# Patient Record
Sex: FEMALE | Race: WHITE
[De-identification: names, ages, dates, MRNs, and addresses within clinical notes are randomized per-mention and may not be internally consistent; named-entity substitution may affect disease eponyms.]

---

## 2017-05-12 ENCOUNTER — HOSPITAL ENCOUNTER (OUTPATIENT)
Dept: HOSPITAL 47 - RADUSWWP | Age: 42
Discharge: HOME | End: 2017-05-12
Payer: COMMERCIAL

## 2017-05-12 DIAGNOSIS — R60.0: Primary | ICD-10-CM

## 2017-05-12 PROCEDURE — 93970 EXTREMITY STUDY: CPT
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
## 2017-06-02 ENCOUNTER — HOSPITAL ENCOUNTER (OUTPATIENT)
Dept: HOSPITAL 47 - RADECHMAIN | Age: 42
Discharge: HOME | End: 2017-06-02
Payer: COMMERCIAL

## 2017-06-02 DIAGNOSIS — Z90.49: ICD-10-CM

## 2017-06-02 DIAGNOSIS — N20.0: Primary | ICD-10-CM

## 2017-06-02 PROCEDURE — 76856 US EXAM PELVIC COMPLETE: CPT

## 2017-06-02 PROCEDURE — 76700 US EXAM ABDOM COMPLETE: CPT

## 2017-06-02 PROCEDURE — 93306 TTE W/DOPPLER COMPLETE: CPT

## 2017-06-02 PROCEDURE — 76830 TRANSVAGINAL US NON-OB: CPT

## 2017-06-02 NOTE — US
EXAMINATION TYPE: US pelvis complete transvag

 

DATE OF EXAM: 6/2/2017

 

COMPARISON: NONE

 

CLINICAL HISTORY: R60.0 LOCALIZED EDEMA. General pelvic pain/pressure; gets Depo injections--hasn't h
ad a cycle in a long time.

 

TECHNIQUE:  Transvaginal (TV) and Transabdominal (TA) 

 

EXAM MEASUREMENTS:

 

Uterus:  6.9 x 3.6 x 4.7 cm

Endometrial Stripe: 0.6 cm

Right Ovary:  2.0 x 1.0 x 2.0 cm

Left Ovary:  2.5 x 1.2 x 2.0 cm

 

TV added to better visualize the pelvic organs.

 

1. Uterus:  retroflexed some limitations; wnl as seen

2. Endometrium:  wnl

3. Right Ovary:  wnl

4. Left Ovary:  wnl

5. Bilateral Adnexa:  wnl

6. Posterior cul-de-sac:  no free fluid seen

 

IMPRESSION: 

 

NORMAL PELVIC ULTRASOUND.

## 2017-06-02 NOTE — ECHOF
Referral Reason:R60.0 localized edema



MEASUREMENTS

--------

HEIGHT: 160.0 cm

WEIGHT: 95.3 kg

BP: 

RVIDd:   2.6 cm     (< 3.3)

IVSd:   1.1 cm     (0.6 - 1.1)

LVIDd:   4.6 cm     (3.9 - 5.3)

LVPWd:   0.9 cm     (0.6 - 1.1)

IVSs:   1.5 cm

LVIDs:   3.3 cm

LVPWs:   1.2 cm

LA Diam:   3.3 cm     (2.7 - 3.8)

LAESV Index (A-L):   16.77 ml/m

Ao Diam:   2.9 cm     (2.0 - 3.7)

AV Cusp:   1.9 cm     (1.5 - 2.6)

LA Diam:   3.4 cm     (2.7 - 3.8)

MV EXCURSION:   15.271 mm     (> 18.000)

MV EF SLOPE:   112 mm/s     (70 - 150)

EPSS:   0.7 cm

MV E Forrest:   0.66 m/s

MV DecT:   238 ms

MV A Forrest:   0.67 m/s

MV E/A Ratio:   0.98 

RAP:   5.00 mmHg

RVSP:   19.61 mmHg







FINDINGS

--------

Sinus rhythm.

This was a technically adequate study.

LV size, wall thickness and systolic function are 

normal, with an EF greater than 55%.

The right ventricle is normal in size.

The right atrial size is normal.

The aortic valve is trileaflet, and appears 

structurally normal. No aortic stenosis or 

regurgitation.   The aortic valve is trileaflet and 

appears structurally normal.

Mild mitral regurgitation is present.

Mild tricuspid regurgitation present.   There is no 

evidence of pulmonary hypertension.   The right 

ventricular systolic pressure, as measured by Doppler, 

is 19.61mmHg.

There is no pulmonic regurgitation present.

The aortic root size is normal.

There is no pericardial effusion.



CONCLUSIONS

--------

1. LV size, wall thickness and systolic function are 

normal, with an EF greater than 55%.

2. The aortic valve is trileaflet and appears structurally 

normal.

3. Mild mitral regurgitation is present.

4. Mild tricuspid regurgitation present.

5. There is no evidence of pulmonary hypertension.

6. The right ventricular systolic pressure, as measured by 

Doppler, is 19.61mmHg.





SONOGRAPHER: Marlee Gary RDCS

## 2017-06-02 NOTE — US
EXAMINATION TYPE: US abdomen complete

 

DATE OF EXAM: 6/2/2017

 

COMPARISON: NONE

 

CLINICAL HISTORY: R60.0 LOCALIZED EDEMA. Larger habitus, post jina, gen back and abd pain

 

EXAM MEASUREMENTS:

 

Liver Length:  16.5cm cm   

Gallbladder Wall:  Surgically absent   

CBD:  0.6 cm

Spleen:  10.7 cm   

Right Kidney:  11.4 x 4.9 x 5.6 cm 

Left Kidney:  10.6 x 4.6 x 5.0  cm   

 

Some exam limitations due to pt habitus and increased overlying bowel gas.

 

Pancreas:  tail gassed out, vis portions wnl

Liver:  some limitations, appears wnl  

Gallbladder:  Surgically absent

**Evidence for sonographic Wise's sign:  no

CBD:  wnl 

Spleen:  wnl   

Right Kidney:  wnl   

Left Kidney:  seen with a 1.6 x 1.3 x 1.0 echogenic shadowing structure at the lower pole     

Upper IVC:  wnl as seen  

Abd Aorta:  wnl as seen

 

Limited views of the pancreas are normal.

 

The liver is normal in size without biliary dilatation.

 

The gallbladder is been removed. Distal common hepatic duct measures 6 mm.

 

The spleen is normal in size.

 

Left kidney is normal. There is a 1.3 cm calculus in the lower pole on the right. This is nonobstruct
ing.

 

Visualized portions of aorta and IVC are normal.

 

IMPRESSION: 

1. NONOBSTRUCTING, 1.3 CM LOWER POLE CALCULUS ON THE RIGHT.

2. STATUS POST CHOLECYSTECTOMY.

## 2018-02-01 ENCOUNTER — HOSPITAL ENCOUNTER (EMERGENCY)
Dept: HOSPITAL 47 - EC | Age: 43
Discharge: HOME | End: 2018-02-01
Payer: COMMERCIAL

## 2018-02-01 VITALS
TEMPERATURE: 98 F | DIASTOLIC BLOOD PRESSURE: 83 MMHG | HEART RATE: 74 BPM | RESPIRATION RATE: 16 BRPM | SYSTOLIC BLOOD PRESSURE: 127 MMHG

## 2018-02-01 DIAGNOSIS — F41.9: Primary | ICD-10-CM

## 2018-02-01 DIAGNOSIS — F31.9: ICD-10-CM

## 2018-02-01 DIAGNOSIS — Z79.899: ICD-10-CM

## 2018-02-01 DIAGNOSIS — E07.9: ICD-10-CM

## 2018-02-01 DIAGNOSIS — F17.200: ICD-10-CM

## 2018-02-01 DIAGNOSIS — R07.89: ICD-10-CM

## 2018-02-01 DIAGNOSIS — Z88.8: ICD-10-CM

## 2018-02-01 LAB
ALBUMIN SERPL-MCNC: 3.8 G/DL (ref 3.5–5)
ALP SERPL-CCNC: 119 U/L (ref 38–126)
ALT SERPL-CCNC: 68 U/L (ref 9–52)
ANION GAP SERPL CALC-SCNC: 9 MMOL/L
APAP SPEC-MCNC: <10 UG/ML
AST SERPL-CCNC: 33 U/L (ref 14–36)
BASOPHILS # BLD AUTO: 0.1 K/UL (ref 0–0.2)
BASOPHILS NFR BLD AUTO: 0 %
BUN SERPL-SCNC: 25 MG/DL (ref 7–17)
CALCIUM SPEC-MCNC: 9.5 MG/DL (ref 8.4–10.2)
CHLORIDE SERPL-SCNC: 108 MMOL/L (ref 98–107)
CO2 SERPL-SCNC: 27 MMOL/L (ref 22–30)
EOSINOPHIL # BLD AUTO: 0.2 K/UL (ref 0–0.7)
EOSINOPHIL NFR BLD AUTO: 1 %
ERYTHROCYTE [DISTWIDTH] IN BLOOD BY AUTOMATED COUNT: 4.62 M/UL (ref 3.8–5.4)
ERYTHROCYTE [DISTWIDTH] IN BLOOD: 14.7 % (ref 11.5–15.5)
GLUCOSE SERPL-MCNC: 85 MG/DL (ref 74–99)
HCT VFR BLD AUTO: 41 % (ref 34–46)
HGB BLD-MCNC: 13.9 GM/DL (ref 11.4–16)
LYMPHOCYTES # SPEC AUTO: 2.9 K/UL (ref 1–4.8)
LYMPHOCYTES NFR SPEC AUTO: 21 %
MCH RBC QN AUTO: 30.1 PG (ref 25–35)
MCHC RBC AUTO-ENTMCNC: 34 G/DL (ref 31–37)
MCV RBC AUTO: 88.6 FL (ref 80–100)
MONOCYTES # BLD AUTO: 0.5 K/UL (ref 0–1)
MONOCYTES NFR BLD AUTO: 3 %
NEUTROPHILS # BLD AUTO: 10.3 K/UL (ref 1.3–7.7)
NEUTROPHILS NFR BLD AUTO: 74 %
PH UR: 6 [PH] (ref 5–8)
PLATELET # BLD AUTO: 311 K/UL (ref 150–450)
POTASSIUM SERPL-SCNC: 4.4 MMOL/L (ref 3.5–5.1)
PROT SERPL-MCNC: 6.6 G/DL (ref 6.3–8.2)
RBC UR QL: 149 /HPF (ref 0–5)
SALICYLATES SERPL-MCNC: <1 MG/DL
SODIUM SERPL-SCNC: 144 MMOL/L (ref 137–145)
SP GR UR: 1.03 (ref 1–1.03)
SQUAMOUS UR QL AUTO: 2 /HPF (ref 0–4)
TROPONIN I SERPL-MCNC: <0.012 NG/ML (ref 0–0.03)
UROBILINOGEN UR QL STRIP: 3 MG/DL (ref ?–2)
WBC # BLD AUTO: 14 K/UL (ref 3.8–10.6)
WBC #/AREA URNS HPF: 65 /HPF (ref 0–5)

## 2018-02-01 PROCEDURE — 81001 URINALYSIS AUTO W/SCOPE: CPT

## 2018-02-01 PROCEDURE — 84484 ASSAY OF TROPONIN QUANT: CPT

## 2018-02-01 PROCEDURE — 81025 URINE PREGNANCY TEST: CPT

## 2018-02-01 PROCEDURE — 99284 EMERGENCY DEPT VISIT MOD MDM: CPT

## 2018-02-01 PROCEDURE — 36415 COLL VENOUS BLD VENIPUNCTURE: CPT

## 2018-02-01 PROCEDURE — 80306 DRUG TEST PRSMV INSTRMNT: CPT

## 2018-02-01 PROCEDURE — 85025 COMPLETE CBC W/AUTO DIFF WBC: CPT

## 2018-02-01 PROCEDURE — 80053 COMPREHEN METABOLIC PANEL: CPT

## 2018-02-01 PROCEDURE — 82075 ASSAY OF BREATH ETHANOL: CPT

## 2018-02-01 PROCEDURE — 83520 IMMUNOASSAY QUANT NOS NONAB: CPT

## 2018-02-01 PROCEDURE — 93005 ELECTROCARDIOGRAM TRACING: CPT

## 2018-02-01 PROCEDURE — 82553 CREATINE MB FRACTION: CPT
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
## 2021-06-10 ENCOUNTER — HOSPITAL ENCOUNTER (OUTPATIENT)
Dept: HOSPITAL 47 - LABWHC1 | Age: 46
Discharge: HOME | End: 2021-06-10
Attending: OBSTETRICS & GYNECOLOGY
Payer: COMMERCIAL

## 2021-06-10 DIAGNOSIS — N90.3: ICD-10-CM

## 2021-06-10 DIAGNOSIS — Z20.822: Primary | ICD-10-CM

## 2021-06-10 PROCEDURE — 87635 SARS-COV-2 COVID-19 AMP PRB: CPT
